# Patient Record
Sex: FEMALE | Race: BLACK OR AFRICAN AMERICAN | NOT HISPANIC OR LATINO | Employment: UNEMPLOYED | URBAN - METROPOLITAN AREA
[De-identification: names, ages, dates, MRNs, and addresses within clinical notes are randomized per-mention and may not be internally consistent; named-entity substitution may affect disease eponyms.]

---

## 2024-06-18 ENCOUNTER — APPOINTMENT (OUTPATIENT)
Dept: RADIOLOGY | Facility: HOSPITAL | Age: 64
End: 2024-06-18
Payer: COMMERCIAL

## 2024-06-18 ENCOUNTER — CLINICAL SUPPORT (OUTPATIENT)
Dept: EMERGENCY MEDICINE | Facility: HOSPITAL | Age: 64
End: 2024-06-18
Payer: COMMERCIAL

## 2024-06-18 ENCOUNTER — HOSPITAL ENCOUNTER (OUTPATIENT)
Facility: HOSPITAL | Age: 64
Setting detail: OBSERVATION
Discharge: HOME | End: 2024-06-19
Attending: EMERGENCY MEDICINE
Payer: COMMERCIAL

## 2024-06-18 DIAGNOSIS — R55 SYNCOPE, UNSPECIFIED SYNCOPE TYPE: Primary | ICD-10-CM

## 2024-06-18 DIAGNOSIS — I10 ESSENTIAL HYPERTENSION: ICD-10-CM

## 2024-06-18 LAB
ALBUMIN SERPL BCP-MCNC: 3.2 G/DL (ref 3.4–5)
ALBUMIN SERPL BCP-MCNC: 3.7 G/DL (ref 3.4–5)
ALP SERPL-CCNC: 62 U/L (ref 33–136)
ALP SERPL-CCNC: 67 U/L (ref 33–136)
ALT SERPL W P-5'-P-CCNC: 6 U/L (ref 7–45)
ALT SERPL W P-5'-P-CCNC: 7 U/L (ref 7–45)
ANION GAP SERPL CALC-SCNC: 19 MMOL/L (ref 10–20)
ANION GAP SERPL CALC-SCNC: 21 MMOL/L (ref 10–20)
AST SERPL W P-5'-P-CCNC: 24 U/L (ref 9–39)
AST SERPL W P-5'-P-CCNC: 40 U/L (ref 9–39)
ATRIAL RATE: 113 BPM
BASOPHILS # BLD AUTO: 0.06 X10*3/UL (ref 0–0.1)
BASOPHILS NFR BLD AUTO: 0.8 %
BILIRUB SERPL-MCNC: 1.4 MG/DL (ref 0–1.2)
BILIRUB SERPL-MCNC: 1.4 MG/DL (ref 0–1.2)
BUN SERPL-MCNC: 5 MG/DL (ref 6–23)
BUN SERPL-MCNC: 7 MG/DL (ref 6–23)
CALCIUM SERPL-MCNC: 9.1 MG/DL (ref 8.6–10.6)
CALCIUM SERPL-MCNC: 9.3 MG/DL (ref 8.6–10.6)
CARDIAC TROPONIN I PNL SERPL HS: 16 NG/L (ref 0–34)
CARDIAC TROPONIN I PNL SERPL HS: 23 NG/L (ref 0–34)
CHLORIDE SERPL-SCNC: 93 MMOL/L (ref 98–107)
CHLORIDE SERPL-SCNC: 96 MMOL/L (ref 98–107)
CO2 SERPL-SCNC: 21 MMOL/L (ref 21–32)
CO2 SERPL-SCNC: 24 MMOL/L (ref 21–32)
CREAT SERPL-MCNC: 0.39 MG/DL (ref 0.5–1.05)
CREAT SERPL-MCNC: 0.45 MG/DL (ref 0.5–1.05)
EGFRCR SERPLBLD CKD-EPI 2021: >90 ML/MIN/1.73M*2
EGFRCR SERPLBLD CKD-EPI 2021: >90 ML/MIN/1.73M*2
EOSINOPHIL # BLD AUTO: 0.04 X10*3/UL (ref 0–0.7)
EOSINOPHIL NFR BLD AUTO: 0.5 %
ERYTHROCYTE [DISTWIDTH] IN BLOOD BY AUTOMATED COUNT: 13.2 % (ref 11.5–14.5)
GLUCOSE BLD MANUAL STRIP-MCNC: 90 MG/DL (ref 74–99)
GLUCOSE SERPL-MCNC: 91 MG/DL (ref 74–99)
GLUCOSE SERPL-MCNC: 96 MG/DL (ref 74–99)
HCT VFR BLD AUTO: 37.6 % (ref 36–46)
HGB BLD-MCNC: 13.6 G/DL (ref 12–16)
IMM GRANULOCYTES # BLD AUTO: 0.04 X10*3/UL (ref 0–0.7)
IMM GRANULOCYTES NFR BLD AUTO: 0.5 % (ref 0–0.9)
LYMPHOCYTES # BLD AUTO: 1.68 X10*3/UL (ref 1.2–4.8)
LYMPHOCYTES NFR BLD AUTO: 21.3 %
MAGNESIUM SERPL-MCNC: 2.24 MG/DL (ref 1.6–2.4)
MCH RBC QN AUTO: 31.2 PG (ref 26–34)
MCHC RBC AUTO-ENTMCNC: 36.2 G/DL (ref 32–36)
MCV RBC AUTO: 86 FL (ref 80–100)
MONOCYTES # BLD AUTO: 0.71 X10*3/UL (ref 0.1–1)
MONOCYTES NFR BLD AUTO: 9 %
NEUTROPHILS # BLD AUTO: 5.36 X10*3/UL (ref 1.2–7.7)
NEUTROPHILS NFR BLD AUTO: 67.9 %
NRBC BLD-RTO: 0.3 /100 WBCS (ref 0–0)
P AXIS: 67 DEGREES
P OFFSET: 209 MS
P ONSET: 161 MS
PLATELET # BLD AUTO: 313 X10*3/UL (ref 150–450)
POTASSIUM SERPL-SCNC: 3.9 MMOL/L (ref 3.5–5.3)
POTASSIUM SERPL-SCNC: 5.4 MMOL/L (ref 3.5–5.3)
PR INTERVAL: 130 MS
PROT SERPL-MCNC: 7.3 G/DL (ref 6.4–8.2)
PROT SERPL-MCNC: 8.6 G/DL (ref 6.4–8.2)
Q ONSET: 226 MS
QRS COUNT: 18 BEATS
QRS DURATION: 112 MS
QT INTERVAL: 368 MS
QTC CALCULATION(BAZETT): 504 MS
QTC FREDERICIA: 454 MS
R AXIS: 46 DEGREES
RBC # BLD AUTO: 4.36 X10*6/UL (ref 4–5.2)
SODIUM SERPL-SCNC: 130 MMOL/L (ref 136–145)
SODIUM SERPL-SCNC: 135 MMOL/L (ref 136–145)
T AXIS: 30 DEGREES
T OFFSET: 410 MS
VENTRICULAR RATE: 113 BPM
WBC # BLD AUTO: 7.9 X10*3/UL (ref 4.4–11.3)

## 2024-06-18 PROCEDURE — 84484 ASSAY OF TROPONIN QUANT: CPT

## 2024-06-18 PROCEDURE — 84484 ASSAY OF TROPONIN QUANT: CPT | Mod: 91

## 2024-06-18 PROCEDURE — 96361 HYDRATE IV INFUSION ADD-ON: CPT | Mod: 59

## 2024-06-18 PROCEDURE — 80053 COMPREHEN METABOLIC PANEL: CPT | Mod: 91

## 2024-06-18 PROCEDURE — G0378 HOSPITAL OBSERVATION PER HR: HCPCS

## 2024-06-18 PROCEDURE — 93010 ELECTROCARDIOGRAM REPORT: CPT | Performed by: EMERGENCY MEDICINE

## 2024-06-18 PROCEDURE — 85025 COMPLETE CBC W/AUTO DIFF WBC: CPT

## 2024-06-18 PROCEDURE — 93005 ELECTROCARDIOGRAM TRACING: CPT

## 2024-06-18 PROCEDURE — 99285 EMERGENCY DEPT VISIT HI MDM: CPT | Mod: 25

## 2024-06-18 PROCEDURE — 71045 X-RAY EXAM CHEST 1 VIEW: CPT

## 2024-06-18 PROCEDURE — 36415 COLL VENOUS BLD VENIPUNCTURE: CPT

## 2024-06-18 PROCEDURE — 71045 X-RAY EXAM CHEST 1 VIEW: CPT | Performed by: RADIOLOGY

## 2024-06-18 PROCEDURE — 83735 ASSAY OF MAGNESIUM: CPT

## 2024-06-18 PROCEDURE — 2500000004 HC RX 250 GENERAL PHARMACY W/ HCPCS (ALT 636 FOR OP/ED)

## 2024-06-18 PROCEDURE — 70450 CT HEAD/BRAIN W/O DYE: CPT | Performed by: RADIOLOGY

## 2024-06-18 PROCEDURE — 96360 HYDRATION IV INFUSION INIT: CPT | Mod: 59

## 2024-06-18 PROCEDURE — 82947 ASSAY GLUCOSE BLOOD QUANT: CPT | Mod: 59

## 2024-06-18 PROCEDURE — 70450 CT HEAD/BRAIN W/O DYE: CPT

## 2024-06-18 PROCEDURE — 99285 EMERGENCY DEPT VISIT HI MDM: CPT | Performed by: EMERGENCY MEDICINE

## 2024-06-18 ASSESSMENT — PAIN SCALES - GENERAL: PAINLEVEL_OUTOF10: 0 - NO PAIN

## 2024-06-18 ASSESSMENT — COLUMBIA-SUICIDE SEVERITY RATING SCALE - C-SSRS
1. IN THE PAST MONTH, HAVE YOU WISHED YOU WERE DEAD OR WISHED YOU COULD GO TO SLEEP AND NOT WAKE UP?: NO
6. HAVE YOU EVER DONE ANYTHING, STARTED TO DO ANYTHING, OR PREPARED TO DO ANYTHING TO END YOUR LIFE?: NO
2. HAVE YOU ACTUALLY HAD ANY THOUGHTS OF KILLING YOURSELF?: NO

## 2024-06-18 ASSESSMENT — PAIN - FUNCTIONAL ASSESSMENT: PAIN_FUNCTIONAL_ASSESSMENT: 0-10

## 2024-06-18 NOTE — ED PROVIDER NOTES
EMERGENCY DEPARTMENT ENCOUNTER      Pt Name: Sade Sampson  MRN: [id number]  Birthdate 1960  Date of evaluation: 6/18/2024  Provider: Khoi Kennedy DO    CHIEF COMPLAINT       Chief Complaint   Patient presents with    Dizziness    Heat Exposure         HISTORY OF PRESENT ILLNESS    HPI    64-year-old female with past medical history significant for hypertension currently not on hypertensive medications and who has not followed up with a physician since 2017 presenting to the emergency department for evaluation after 2 brief back-to-back syncopal episodes.  Patient's son was at bedside and witnessed the event states patient was sitting in side in her chair watching TV when she passed out for a couple of seconds immediately woke up and after a minute or so had another brief episode that lasted a couple of seconds.  Patient states she felt a little bit nauseous prior to the syncopal episode but otherwise denies any symptoms such as chest pain, shortness of breath, lightheadedness, heart palpitations.  Patient does not have air conditioning in her house and reportedly improved after being placed in the air conditioned ambulance. States she has been well and has not been sick over the past couple weeks.  Denies lower extremity pain or swelling, URI or UTI symptoms.  Patient states she is never passed out like this in the past does not have any personal or family history of cardiac problems as far she is aware.     Nursing Notes were reviewed.    PAST MEDICAL HISTORY   No past medical history on file.      SURGICAL HISTORY     No past surgical history on file.      CURRENT MEDICATIONS       Previous Medications    No medications on file       ALLERGIES     Patient has no known allergies.    FAMILY HISTORY     No family history on file.       SOCIAL HISTORY       Social History     Socioeconomic History    Marital status: Single     Spouse name: Not on file    Number of children: Not on file    Years of  education: Not on file    Highest education level: Not on file   Occupational History    Not on file   Tobacco Use    Smoking status: Not on file    Smokeless tobacco: Not on file   Substance and Sexual Activity    Alcohol use: Not on file    Drug use: Not on file    Sexual activity: Not on file   Other Topics Concern    Not on file   Social History Narrative    Not on file     Social Determinants of Health     Financial Resource Strain: Not on file   Food Insecurity: Not on file   Transportation Needs: Not on file   Physical Activity: Not on file   Stress: Not on file   Social Connections: Not on file   Intimate Partner Violence: Not on file   Housing Stability: Not on file       SCREENINGS                        PHYSICAL EXAM    (up to 7 for level 4, 8 or more for level 5)     ED Triage Vitals   Temperature Heart Rate Respirations BP   06/18/24 1308 06/18/24 1308 06/18/24 1308 06/18/24 1308   36.6 °C (97.9 °F) (!) 112 16 179/78      Pulse Ox Temp src Heart Rate Source Patient Position   06/18/24 1308 -- -- --   99 %         BP Location FiO2 (%)     06/18/24 1557 --     Left arm        Physical Exam  Vitals and nursing note reviewed.   Constitutional:       General: She is not in acute distress.     Appearance: Normal appearance. She is not ill-appearing or toxic-appearing.   HENT:      Head: Normocephalic and atraumatic.      Right Ear: External ear normal.      Left Ear: External ear normal.      Mouth/Throat:      Comments: Tongue laceration  Eyes:      Conjunctiva/sclera: Conjunctivae normal.   Cardiovascular:      Rate and Rhythm: Regular rhythm. Tachycardia present.      Pulses: Normal pulses.      Heart sounds: Normal heart sounds.   Pulmonary:      Effort: Pulmonary effort is normal.      Breath sounds: Normal breath sounds.   Abdominal:      General: Abdomen is flat. There is no distension.      Palpations: Abdomen is soft. There is no mass.      Tenderness: There is no abdominal tenderness. There is no  guarding or rebound.      Hernia: No hernia is present.   Musculoskeletal:         General: No swelling, tenderness, deformity or signs of injury. Normal range of motion.      Cervical back: Normal range of motion and neck supple. No rigidity or tenderness.      Right lower leg: No edema.      Left lower leg: No edema.   Lymphadenopathy:      Cervical: No cervical adenopathy.   Skin:     General: Skin is warm and dry.   Neurological:      General: No focal deficit present.      Mental Status: She is alert and oriented to person, place, and time.      Cranial Nerves: No cranial nerve deficit.      Sensory: No sensory deficit.      Motor: No weakness.      Coordination: Coordination normal.   Psychiatric:         Mood and Affect: Mood normal.         Behavior: Behavior normal.          DIAGNOSTIC RESULTS     LABS:  Labs Reviewed   CBC WITH AUTO DIFFERENTIAL - Abnormal       Result Value    WBC 7.9      nRBC 0.3 (*)     RBC 4.36      Hemoglobin 13.6      Hematocrit 37.6      MCV 86      MCH 31.2      MCHC 36.2 (*)     RDW 13.2      Platelets 313      Neutrophils % 67.9      Immature Granulocytes %, Automated 0.5      Lymphocytes % 21.3      Monocytes % 9.0      Eosinophils % 0.5      Basophils % 0.8      Neutrophils Absolute 5.36      Immature Granulocytes Absolute, Automated 0.04      Lymphocytes Absolute 1.68      Monocytes Absolute 0.71      Eosinophils Absolute 0.04      Basophils Absolute 0.06     COMPREHENSIVE METABOLIC PANEL - Abnormal    Glucose 96      Sodium 130 (*)     Potassium 5.4 (*)     Chloride 93 (*)     Bicarbonate 21      Anion Gap 21 (*)     Urea Nitrogen 7      Creatinine 0.45 (*)     eGFR >90      Calcium 9.3      Albumin 3.7      Alkaline Phosphatase 67      Total Protein 8.6 (*)     AST 40 (*)     Bilirubin, Total 1.4 (*)     ALT 7     COMPREHENSIVE METABOLIC PANEL - Abnormal    Glucose 91      Sodium 135 (*)     Potassium 3.9      Chloride 96 (*)     Bicarbonate 24      Anion Gap 19      Urea  Nitrogen 5 (*)     Creatinine 0.39 (*)     eGFR >90      Calcium 9.1      Albumin 3.2 (*)     Alkaline Phosphatase 62      Total Protein 7.3      AST 24      Bilirubin, Total 1.4 (*)     ALT 6 (*)    MAGNESIUM - Normal    Magnesium 2.24     SERIAL TROPONIN-INITIAL - Normal    Troponin I, High Sensitivity 16      Narrative:     Less than 99th percentile of normal range cutoff-  Female and children under 18 years old <35 ng/L; Male <54 ng/L: Negative  Repeat testing should be performed if clinically indicated.     Female and children under 18 years old  ng/L; Male  ng/L:  Consistent with possible cardiac damage and possible increased clinical   risk. Serial measurements may help to assess extent of myocardial damage.     >120 ng/L: Consistent with cardiac damage, increased clinical risk and  myocardial infarction. Serial measurements may help assess extent of   myocardial damage.      NOTE: Children less than 1 year old may have higher baseline troponin   levels and results should be interpreted in conjunction with the overall   clinical context.    NOTE: Troponin I testing is performed using a different   testing methodology at [facility] than at other   [facility]. Direct result comparisons should only   be made within the same method.     SERIAL TROPONIN, 1 HOUR - Normal    Troponin I, High Sensitivity 23      Narrative:     Less than 99th percentile of normal range cutoff-  Female and children under 18 years old <35 ng/L; Male <54 ng/L: Negative  Repeat testing should be performed if clinically indicated.     Female and children under 18 years old  ng/L; Male  ng/L:  Consistent with possible cardiac damage and possible increased clinical   risk. Serial measurements may help to assess extent of myocardial damage.     >120 ng/L: Consistent with cardiac damage, increased clinical risk and  myocardial infarction. Serial measurements may help assess extent of   myocardial  damage.      NOTE: Children less than 1 year old may have higher baseline troponin   levels and results should be interpreted in conjunction with the overall   clinical context.    NOTE: Troponin I testing is performed using a different   testing methodology at [facility] than at other   [facility]. Direct result comparisons should only   be made within the same method.     POCT GLUCOSE - Normal    POCT Glucose 90     TROPONIN SERIES- (INITIAL, 1 HR)    Narrative:     The following orders were created for panel order Troponin I Series, High Sensitivity (0, 1 HR).  Procedure                               Abnormality         Status                     ---------                               -----------         ------                     Troponin I, High Sensiti...[[id number]]  Normal              Final result               Troponin, High Sensitivi...[[id number]]  Normal              Final result                 Please view results for these tests on the individual orders.   POCT GLUCOSE METER       All other labs were within normal range or not returned as of this dictation.    Imaging  CT head wo IV contrast   Final Result   No acute intracranial hemorrhage or mass effect.             MACRO:   None        Signed by: Bertrand Haas 6/18/2024 9:54 PM   Dictation workstation:   [id number]      XR chest 1 view   Final Result   1. Slight hyperinflation with some prominent interstitium suggesting   some COPD changes. No evidence of acute intrathoracic abnormality.        I personally reviewed the images/study and I agree with the findings   as stated by Dr. Tan Epperson. This study was interpreted at   [facility], [place], [place].        MACRO:   None        Signed by: Meng Grace 6/18/2024 6:52 PM   Dictation workstation:   [id number]           Procedures  Procedures     EMERGENCY DEPARTMENT COURSE/MDM:     ED Course as of 06/18/24 2325   Tue Jun 18, 2024 2009  "Supervising resident note: 65yo F no significant medical hx (no close physician follow up) presented to the ED after 2 episodes of \"unresponsiveness with shaking.\" Initial concern for passing out 2/2 heat in the house.  On my exam pt has tongue lac and had incontinence c/f seizure. Will add CT head and consider neuro consult given long outpatient follow up times and no medical plug in. Will also continue syncope workup.     Mary Anderson MD.   Emergency Medicine, PGY-3 [KR]      ED Course User Index  [KR] Mary Anderson MD         Diagnoses as of 06/18/24 2325   Syncope, unspecified syncope type        Medical Decision Making    64-year-old female with past medical history significant for hypertension currently not on hypertensive medications and who has not followed up with a physician since 2017 presenting to the emergency department for evaluation after 2 brief back-to-back syncopal episodes.  Patient is hypertensive with a blood pressure 179/78, tachycardic with a heart rate of 112.  Vitals otherwise WNL.  Afebrile, no acute distress, nontoxic appearing.  Cardiac workup, liter bolus of LR ordered.  Senior resident evaluated patient noted tongue laceration and patient reported urinary incontinence suggestive of possible seizure.  CT head without IV contrast ordered and neurology consulted.  No evidence of acute pathology on CT.  Neurology evaluated patient and advised ordering EEG.  Per EEG tech study cannot be performed until tomorrow morning so patient was admitted to the CDU for syncope workup including EEG in the morning with neurology on consult.    EKG: Sinus tachycardia with a ventricular rate of 113 bpm, parable 130 ms,  ms, QTc 504 ms.  Right bundle branch block with no prior EKG available for comparison.  No evidence of acute ischemic changes.    Patient signed out to night resident Dr. Zee pending transfer over to the CDU.    Patient and or family in agreement and understanding of " treatment plan.  All questions answered.      I reviewed the case with the attending ED physician. The attending ED physician agrees with the plan. Patient and/or patient´s representative was counseled regarding labs, imaging, likely diagnosis, and plan. All questions were answered.    ED Medications administered this visit:    Medications   lactated Ringer's bolus 1,000 mL (0 mL intravenous Stopped 6/18/24 1381)       New Prescriptions from this visit:    New Prescriptions    No medications on file       Follow-up:  No follow-up provider specified.      Final Impression:   1. Syncope, unspecified syncope type          (Please note that portions of this note were completed with a voice recognition program.  Efforts were made to edit the dictations but occasionally words are mis-transcribed.)     Khoi Kennedy,   Resident  06/18/24 4800

## 2024-06-18 NOTE — ED TRIAGE NOTES
Patient family states that the patient stopped responding and had head shaking that lasted about 45 seconds. Patient brother was calling her name and she was not responding and then when she awoke she was confused. Patient brother states that EMS believes that the house was hot and that when she got into the ambulance with the air conditioning she improved. Last thing patient remembers was talking to her brother. Patient has no complaints just saying she feels dizzy and unsteady. Has no AC in house

## 2024-06-19 ENCOUNTER — APPOINTMENT (OUTPATIENT)
Dept: CARDIOLOGY | Facility: HOSPITAL | Age: 64
End: 2024-06-19
Payer: COMMERCIAL

## 2024-06-19 ENCOUNTER — APPOINTMENT (OUTPATIENT)
Dept: NEUROLOGY | Facility: HOSPITAL | Age: 64
End: 2024-06-19
Payer: COMMERCIAL

## 2024-06-19 ENCOUNTER — PHARMACY VISIT (OUTPATIENT)
Dept: PHARMACY | Facility: CLINIC | Age: 64
End: 2024-06-19
Payer: COMMERCIAL

## 2024-06-19 ENCOUNTER — APPOINTMENT (OUTPATIENT)
Dept: RADIOLOGY | Facility: HOSPITAL | Age: 64
End: 2024-06-19
Payer: COMMERCIAL

## 2024-06-19 VITALS
HEART RATE: 92 BPM | BODY MASS INDEX: 29.66 KG/M2 | RESPIRATION RATE: 18 BRPM | WEIGHT: 178 LBS | SYSTOLIC BLOOD PRESSURE: 169 MMHG | HEIGHT: 65 IN | OXYGEN SATURATION: 98 % | DIASTOLIC BLOOD PRESSURE: 70 MMHG | TEMPERATURE: 97 F

## 2024-06-19 LAB
AORTIC VALVE PEAK VELOCITY: 1.51 M/S
AV PEAK GRADIENT: 9.1 MMHG
AVA (PEAK VEL): 2.63 CM2
EJECTION FRACTION APICAL 4 CHAMBER: 75.3
LEFT ATRIUM VOLUME AREA LENGTH INDEX BSA: 12.9 ML/M2
LEFT VENTRICLE INTERNAL DIMENSION DIASTOLE: 3.3 CM (ref 3.5–6)
LEFT VENTRICULAR OUTFLOW TRACT DIAMETER: 1.9 CM
LV EJECTION FRACTION BIPLANE: 77 %
MITRAL VALVE E/A RATIO: 0.61
MITRAL VALVE E/E' RATIO: 14.33
RIGHT VENTRICLE FREE WALL PEAK S': 8.81 CM/S
TRICUSPID ANNULAR PLANE SYSTOLIC EXCURSION: 2.1 CM

## 2024-06-19 PROCEDURE — 95816 EEG AWAKE AND DROWSY: CPT | Performed by: PSYCHIATRY & NEUROLOGY

## 2024-06-19 PROCEDURE — 93880 EXTRACRANIAL BILAT STUDY: CPT | Performed by: RADIOLOGY

## 2024-06-19 PROCEDURE — G0378 HOSPITAL OBSERVATION PER HR: HCPCS

## 2024-06-19 PROCEDURE — 93880 EXTRACRANIAL BILAT STUDY: CPT

## 2024-06-19 PROCEDURE — RXMED WILLOW AMBULATORY MEDICATION CHARGE

## 2024-06-19 PROCEDURE — 93306 TTE W/DOPPLER COMPLETE: CPT | Performed by: INTERNAL MEDICINE

## 2024-06-19 PROCEDURE — 2500000004 HC RX 250 GENERAL PHARMACY W/ HCPCS (ALT 636 FOR OP/ED)

## 2024-06-19 PROCEDURE — 93306 TTE W/DOPPLER COMPLETE: CPT

## 2024-06-19 PROCEDURE — 95816 EEG AWAKE AND DROWSY: CPT

## 2024-06-19 RX ORDER — LISINOPRIL 10 MG/1
10 TABLET ORAL DAILY
Qty: 30 TABLET | Refills: 0 | Status: SHIPPED | OUTPATIENT
Start: 2024-06-19 | End: 2024-07-19

## 2024-06-19 RX ORDER — HYDRALAZINE HYDROCHLORIDE 20 MG/ML
10 INJECTION INTRAMUSCULAR; INTRAVENOUS ONCE
Status: DISCONTINUED | OUTPATIENT
Start: 2024-06-19 | End: 2024-06-19

## 2024-06-19 RX ORDER — LISINOPRIL 20 MG/1
20 TABLET ORAL DAILY
Status: DISCONTINUED | OUTPATIENT
Start: 2024-06-19 | End: 2024-06-19 | Stop reason: HOSPADM

## 2024-06-19 NOTE — H&P
History and Physical  [facility] EMERGENCY MEDICINE  Patient: Sade Sampson  MRN: [id number]  : 1960  Date of Evaluation: 2024  ED Provider: COY Kruger      Limitations to history: None  Independent Historian: Patient  External Records Reviewed: Yes      Patient History:  Sade Sampson is a 64 y.o. female with past medical history significant for HTN, and presents to the clinical decision unit for syncope.  Patient states she is unsure what occurred, states family told her she passed out.  Per electronic records boyfriend witnessed 2 episodes of patient passing out.  Patient denies history of seizures, diabetes, strokes or MI.  Patient denies fever, chills, chest pain, shortness of breath, headache, nausea, abdominal pain, hematuria or bloody stool.    The acute medical evaluation included EKG, labs and imaging.  Her initial EKG showed ST, no acute ischemic changes  CBC showed no leukocytosis, acute anemia or thrombocytopenia.  CMP showed sodium low at 135, chloride low at 96, BUN low at 5, creatinine low at 0.39, ALT low at 6, otherwise no significant electrolyte derangement.  Serial high-sensitivity troponins 23, 16 magnesium within normal limits at 2.24.  CT head showed no evidence of ICH, mass, midline shift or infarct.  CXR showed Slight hyperinflation with some prominent interstitium suggesting some COPD changes. No evidence of acute intrathoracic abnormality, osseous changes, pneumonia, pleural effusion or focal consolidation.     While in the ED, she received 1L of LR.  Neurology was consulted and recommended:  - Please obtain a STAT routine EEG   - If EEG is normal, would discharge with seizure precautions (see below)   - If EEG is suggestive of epileptic event, would discharge on keppra 750 mg q12hr (can load with 1500 mg IV) and seizure precautions (see below)   - Please request follow up with Neurology (resident clinic) in 1-2 months  .  After discussion with  the ED provider, a decision was made to admit the patient to the Clinical Decision Unit for syncope.  Plan is to complete a syncopal workup and EEG scheduled for tomorrow.          In the emergency department, the acute evaluation included:  Orders Placed This Encounter   Procedures    XR chest 1 view    CT head wo IV contrast    Troponin I Series, High Sensitivity (0, 1 HR)    CBC and Auto Differential    Comprehensive Metabolic Panel    Magnesium    Troponin I, High Sensitivity, Initial    Troponin, High Sensitivity, 1 Hour    Comprehensive metabolic panel    Vital Signs    Cardiac Monitoring - ED/ICU/PACU Only    Orthostatic vitals    POCT GLUCOSE    ECG 12 lead    ECG 12 lead    ECG 12 lead    EEG    Insert and maintain peripheral IV    Send to CDU    Initiate observation status       I reviewed the below labs and imaging as ordered by the ED provider:  CT head wo IV contrast   Final Result   No acute intracranial hemorrhage or mass effect.             MACRO:   None        Signed by: Bertrand Haas 6/18/2024 9:54 PM   Dictation workstation:   [id number]      XR chest 1 view   Final Result   1. Slight hyperinflation with some prominent interstitium suggesting   some COPD changes. No evidence of acute intrathoracic abnormality.        I personally reviewed the images/study and I agree with the findings   as stated by Dr. Tan Epperson. This study was interpreted at   [facility], [place], [place].        MACRO:   None        Signed by: Meng Grace 6/18/2024 6:52 PM   Dictation workstation:   [id number]          Labs Reviewed   CBC WITH AUTO DIFFERENTIAL - Abnormal       Result Value    WBC 7.9      nRBC 0.3 (*)     RBC 4.36      Hemoglobin 13.6      Hematocrit 37.6      MCV 86      MCH 31.2      MCHC 36.2 (*)     RDW 13.2      Platelets 313      Neutrophils % 67.9      Immature Granulocytes %, Automated 0.5      Lymphocytes % 21.3      Monocytes % 9.0      Eosinophils % 0.5       Basophils % 0.8      Neutrophils Absolute 5.36      Immature Granulocytes Absolute, Automated 0.04      Lymphocytes Absolute 1.68      Monocytes Absolute 0.71      Eosinophils Absolute 0.04      Basophils Absolute 0.06     COMPREHENSIVE METABOLIC PANEL - Abnormal    Glucose 96      Sodium 130 (*)     Potassium 5.4 (*)     Chloride 93 (*)     Bicarbonate 21      Anion Gap 21 (*)     Urea Nitrogen 7      Creatinine 0.45 (*)     eGFR >90      Calcium 9.3      Albumin 3.7      Alkaline Phosphatase 67      Total Protein 8.6 (*)     AST 40 (*)     Bilirubin, Total 1.4 (*)     ALT 7     COMPREHENSIVE METABOLIC PANEL - Abnormal    Glucose 91      Sodium 135 (*)     Potassium 3.9      Chloride 96 (*)     Bicarbonate 24      Anion Gap 19      Urea Nitrogen 5 (*)     Creatinine 0.39 (*)     eGFR >90      Calcium 9.1      Albumin 3.2 (*)     Alkaline Phosphatase 62      Total Protein 7.3      AST 24      Bilirubin, Total 1.4 (*)     ALT 6 (*)    MAGNESIUM - Normal    Magnesium 2.24     SERIAL TROPONIN-INITIAL - Normal    Troponin I, High Sensitivity 16      Narrative:     Less than 99th percentile of normal range cutoff-  Female and children under 18 years old <35 ng/L; Male <54 ng/L: Negative  Repeat testing should be performed if clinically indicated.     Female and children under 18 years old  ng/L; Male  ng/L:  Consistent with possible cardiac damage and possible increased clinical   risk. Serial measurements may help to assess extent of myocardial damage.     >120 ng/L: Consistent with cardiac damage, increased clinical risk and  myocardial infarction. Serial measurements may help assess extent of   myocardial damage.      NOTE: Children less than 1 year old may have higher baseline troponin   levels and results should be interpreted in conjunction with the overall   clinical context.    NOTE: Troponin I testing is performed using a different   testing methodology at [facility] than at other    [facility]. Direct result comparisons should only   be made within the same method.     SERIAL TROPONIN, 1 HOUR - Normal    Troponin I, High Sensitivity 23      Narrative:     Less than 99th percentile of normal range cutoff-  Female and children under 18 years old <35 ng/L; Male <54 ng/L: Negative  Repeat testing should be performed if clinically indicated.     Female and children under 18 years old  ng/L; Male  ng/L:  Consistent with possible cardiac damage and possible increased clinical   risk. Serial measurements may help to assess extent of myocardial damage.     >120 ng/L: Consistent with cardiac damage, increased clinical risk and  myocardial infarction. Serial measurements may help assess extent of   myocardial damage.      NOTE: Children less than 1 year old may have higher baseline troponin   levels and results should be interpreted in conjunction with the overall   clinical context.    NOTE: Troponin I testing is performed using a different   testing methodology at [facility] than at other   [facility]. Direct result comparisons should only   be made within the same method.     POCT GLUCOSE - Normal    POCT Glucose 90     TROPONIN SERIES- (INITIAL, 1 HR)    Narrative:     The following orders were created for panel order Troponin I Series, High Sensitivity (0, 1 HR).  Procedure                               Abnormality         Status                     ---------                               -----------         ------                     Troponin I, High Sensiti...[[id number]]  Normal              Final result               Troponin, High Sensitivi...[[id number]]  Normal              Final result                 Please view results for these tests on the individual orders.   POCT GLUCOSE METER       Upon admission to the Clinical Decision Unit,  Sade Sampson is alert and oriented and appears in no acute distress.  Vital signs were reviewed.    Past History   No  "past medical history on file.  No past surgical history on file.  Social History     Socioeconomic History    Marital status: Single     Spouse name: Not on file    Number of children: Not on file    Years of education: Not on file    Highest education level: Not on file   Occupational History    Not on file   Tobacco Use    Smoking status: Not on file    Smokeless tobacco: Not on file   Substance and Sexual Activity    Alcohol use: Not on file    Drug use: Not on file    Sexual activity: Not on file   Other Topics Concern    Not on file   Social History Narrative    Not on file     Social Determinants of Health     Financial Resource Strain: Not on file   Food Insecurity: Not on file   Transportation Needs: Not on file   Physical Activity: Not on file   Stress: Not on file   Social Connections: Not on file   Intimate Partner Violence: Not on file   Housing Stability: Not on file         Medications/Allergies     Previous Medications    No medications on file     No Known Allergies          Review of systems:  All systems reviewed and otherwise negative, except as stated above in HPI.        Physical Exam     Visit Vitals  /81 (BP Location: Left arm, Patient Position: Lying)   Pulse (!) 102   Temp 36.1 °C (97 °F) (Temporal)   Resp 18   Ht 1.651 m (5' 5\")   Wt 80.7 kg (178 lb)   SpO2 100%   BMI 29.62 kg/m²   BSA 1.92 m²       GENERAL:  The patient appears nontoxic, nourished and normally developed. Vital signs as documented.     HEENT:  Head normocephalic, atraumatic, EOMs intact, Mucous membranes moist. Nares patent without copious rhinorrhea.  Oropharynx moist and clear.  No drooling. Uvula midline.    Neck: Supple.  No meningismus.  No swelling.  Trachea midline. No lymphadenopathy.    Pulmonary:  Lungs are clear to auscultation, without any respiratory distress.  No wheezing, crackles or rales.  No hypoxia or dyspnea.  Able to speak full sentences, no accessory muscle use.    Cardiac:  Tachycardiac.  No " murmurs, rubs or gallops.  No JVD.    GI:  Soft, non-distended, non-tender, BS positive x 4 quadrants, No rebound or guarding, no peritoneal signs, no CVA tenderness, no masses or organomegaly.    Musculoskeletal: Symmetrical muscle bulk. No peripheral edema.  Pulses intact distal.  Able to walk.    Integumentary: Warm, dry and intact.  No pallor or jaundice.  No lesions, rashes or open sores.    NEURO:  NIH 0. No obvious neurological deficits, normal sensation and strength bilaterally.  Speech clear and fluent.  Able to follow commands, CN 2-12 intact.    Psych: Appropriate mood and affect.    Consultants:  1.)  Neurology following            Impression and Plan  In summary, Sade Sampson is admitted to the [facility] Clinical Decision Unit for syncope. Dr. Jose David Alvarez is the CDU admission attending.    This patient has been risk-stratified based on available history, physical exam, and related study findings. Admission to the observation status for further diagnosis/treatment/monitoring of syncope is warranted clinically. This extended period of observation is specifically required to determine the need for hospitalization.     The goals of this admission based on the patient’s clinical problem list are:   1.)  Syncope  -Continuous telemetry  -Echo scheduled  -Bilateral carotid ultrasound scheduled  -EEG in a.m.  -Cardiac diet    We will observe the patient for the following endpoints:   1.) Stable vitals  2.)  Symptomatic improvement.  3.)  Clear EEG  4.)  Clear Echo and B/L Carotid US  5.) Cleared by neurology    When met, appropriate disposition will be arranged.    Karla Morales, APRN-CNP  [facility]  Emergency department  Extension [phone]

## 2024-06-19 NOTE — CONSULTS
"Epilepsy Consult     Reason for consult: Two episodes of shaking c/f seizures     History Of Present Illness  Sade Sampson is a 64 y.o. female with prior history of HTN, not on any medications, presenting after two event of passing out witnessed by her boyfriend.     History was obtained by chart review and from boyfriend of 15 years (Hari [phone]), who states that she was sitting in their living room, when she suddenly passed out for ~45 seconds, eyes closed, both hands were shaking, and she returned to her baseline after. Shortly after, she had another event, lasted for 5 seconds, same description, eyes were closed with b/l hand shaking.     She denied any prodromal symptoms apart from feeling nauseas before the two events. No prior history of similar events. No history of fever, headaches, flu like symptoms, or recent sickness.     Denied any history of incontinence during sleep, waking up with tongue lacerations/soreness. No family history of seizures. No personal history of CNS infections, head trauma, febrile seizures, or strokes.     Past Medical History  HTN, not on treatment     Surgical History  No surgery     Social History  Lives with her  boyfriend. Fully independent with ADLs.   Denied smoking, alcohol use, or recreational drug use.     Allergies  Patient has no known allergies.    Review of Systems  All systems were reviewed, and negative apart from stated above.     Neurological Exam  Physical Exam  Last Recorded Vitals  Blood pressure 160/81, pulse (!) 102, temperature 36.1 °C (97 °F), temperature source Temporal, resp. rate 18, height 1.651 m (5' 5\"), weight 80.7 kg (178 lb), SpO2 100%.    General Appearance:  No distress, alert, interactive and cooperative.     Cardiovascular: Regular, rate and rhythm, no murmurs, normal S1 and S2.     Mental State: Orientation was normal to time, place and person.   She was able to answer questions correctly, like \"who's the president?\". She was able " to follow complex commands.     Language: Fluent, answers questions appropriately, intact naming of both high and low frequency objects.      Cranial Nerves:   Visual fields full to confrontation.   Pupils round, 4 mm in diameter, equally reactive to light.   EOMs normal alignment, full range with normal saccades.  Bilateral end-gaze nystagmus.   Facial sensation intact bilaterally.   Normal and symmetric facial strength. Nasolabial folds symmetric.   Palate elevates symmetrically.   Tongue midline, with normal bulk, some redness appreciated on the left lateral side, but no clear cut or bleeding.     Motor:   Muscle bulk and tone were normal in both upper and lower extremities.   No pronator drift     Strength     R      L   5      5     Shoulder abduction    5      5     Elbow flexion   5      5     Elbow extension    5      5     Hand      5     5      Hip flexion   5     5      knee extension   5     5      knee flexion   5     5      APF   5     5      ADF     Reflexes:   R     L   2     2   biceps   2     2   BR  1    1    patellar   0    0    ankle     Sensory: In both upper and lower extremities, sensation was intact to light touch  No extinction on simultaneous stimuli.     Coordination: In both upper extremities, finger-nose-finger was intact without dysmetria or overshoot.      Relevant Results    Results from last 72 hours   Lab Units 06/18/24  1848   WBC AUTO x10*3/uL 7.9   HEMOGLOBIN g/dL 13.6   HEMATOCRIT % 37.6   PLATELETS AUTO x10*3/uL 313        Results from last 72 hours   Lab Units 06/18/24  2111 06/18/24  1848   SODIUM mmol/L 135* 130*   POTASSIUM mmol/L 3.9 5.4*   CHLORIDE mmol/L 96* 93*   CO2 mmol/L 24 21   BUN mg/dL 5* 7   CREATININE mg/dL 0.39* 0.45*   MAGNESIUM mg/dL  --  2.24        I have personally reviewed the following imaging results CT head wo IV contrast    CT HEAD WO IV CONTRAST;  6/18/2024 9:24 pm    Moderate diffuse parenchymal volume loss. There are nonspecific  periventricular and subcortical white matter hypodensities which may represent sequela of small-vessel ischemic changes. The grey-white differentiation is intact. There is no mass effect or midline shift.  There is no intracranial hemorrhage. There are calcifications of the intracranial internal carotid arteries.   Calvarium: The calvarium is unremarkable.   Paranasal sinuses and mastoids: There is mucosal thickening of the right maxillary sinus. Otherwise, paranasal sinuses and mastoid air cells are clear.          Assessment/Plan   Active Problems:  There are no active Hospital Problems.    Sade Sampson is a 64 y.o. female with prior history of HTN, not on any medications, presenting after two events witnessed by boyfriend; described as eyes closed, bilateral hands shaking, for few seconds, with return to her baseline in between. No incontinence, confusion, or weakness post-event. Prodromal symptoms were only nausea, and an episode of vomiting after the two events. Both event occur while she was sitting in her living room, with reportedly no AC.     Her neurological examination today is essentially normal, apart from non-specific bilateral end-gaze nystagmus.     CTH personally reviewed, notable for moderate diffuse cortical atrophy and white matter hypodensities most consistent with small vessel disease.     Impression: Likely a vasovagal event, given very brief duration, eyes closed, and return to baseline shortly after. Likely triggered by humidity and warm weather. However, given possible left tongue injury (redness), although  there is no obvious laceration, STAT routine EEG would be helpful to rule out focal slowing or epileptic discharges to suggest cortical focus.     Recommendations:   - Please obtain a STAT routine EEG   - If EEG is normal, would discharge with seizure precautions (see below)   - If EEG is suggestive of epileptic event, would discharge on keppra 750 mg q12hr (can load with 1500 mg IV)  and seizure precautions (see below)   - Please request follow up with Neurology (resident clinic) in 1-2 months    Night team covering epilepsy will reach out to ED/primary team after discussing EEG findings with epilepsy fellow.     Please page with any further questions or concerns.   Epilepsy team pager: [phone]     ------Seizure Precautions---------    - No driving until seizures are under controlled for the period determined by their treating physician.  - Not working with heavy machineries and in close proximity of machines with moving parts.   - Not swimming or bathing unsupervised  (ok to take shower).   - Not working at high places or climbing ladders, avoiding any activity that could put the patient or others at significant risk in the event of a seizure.  - Seizures First Aid and when to call 911 or notify their physician.    ----------------------------------    Mark Puente MD  Neurology Resident PGY3   ---------------------------------------------    Post Staffing updates     Agree that etiology likely convulsive syncope.  Routine EEG normal. Okay for discharge home with seizure precautions as above and outpatient follow up with neurology in resident clinic in 1-2 months.     Vicki Subramanian MD  Department of Neurology, PGY-2  Epilepsy pager [phone]

## 2024-06-19 NOTE — DISCHARGE SUMMARY
[facility] CLINICAL DECISION UNIT  DISCHARGE SUMMARY    Patient: Sade Sampson  MRN: [id number]  : 1960  Date of Evaluation: 2024  ED Provider: COY Barker    Limitations to history: None  Independent Historian: Yes  External Records Reviewed: Yes      Subjective:  Sade Sampson is a 64 year-old female has undergone comprehensive diagnostic evaluation and therapeutic management in accordance with the CDU guidelines for syncope. Based on Ms. Sampson clinical response and diagnostic information during this period of observation, it has been determined that the patient will be discharged.    The acute evaluation included:  Orders Placed This Encounter   Procedures    XR chest 1 view    CT head wo IV contrast    Troponin I Series, High Sensitivity (0, 1 HR)    CBC and Auto Differential    Comprehensive Metabolic Panel    Magnesium    Troponin I, High Sensitivity, Initial    Troponin, High Sensitivity, 1 Hour    Comprehensive metabolic panel    Referral to Primary Care    Referral to Cardiology    Referral to Neurology    Adult diet Regular, Cardiac; 70 gm fat; 2 - 3 grams Sodium    Vital Signs    Cardiac Monitoring - ED/ICU/PACU Only    Orthostatic vitals    Activity (specify) No Restrictions    Vital Signs    Telemetry monitoring    Orthostatic blood pressure    Full code    POCT GLUCOSE    ECG 12 lead    ECG 12 lead    ECG 12 lead    Electrocardiogram, 12-lead PRN ACS symptoms    Transthoracic Echo (TTE) Complete    EEG    Insert and maintain peripheral IV    Send to CDU    Initiate observation status       Results for orders placed or performed during the hospital encounter of 24   CBC and Auto Differential   Result Value Ref Range    WBC 7.9 4.4 - 11.3 x10*3/uL    nRBC 0.3 (H) 0.0 - 0.0 /100 WBCs    RBC 4.36 4.00 - 5.20 x10*6/uL    Hemoglobin 13.6 12.0 - 16.0 g/dL    Hematocrit 37.6 36.0 - 46.0 %    MCV 86 80 - 100 fL    MCH 31.2 26.0 - 34.0 pg    MCHC 36.2 (H) 32.0 - 36.0 g/dL    RDW  13.2 11.5 - 14.5 %    Platelets 313 150 - 450 x10*3/uL    Neutrophils % 67.9 40.0 - 80.0 %    Immature Granulocytes %, Automated 0.5 0.0 - 0.9 %    Lymphocytes % 21.3 13.0 - 44.0 %    Monocytes % 9.0 2.0 - 10.0 %    Eosinophils % 0.5 0.0 - 6.0 %    Basophils % 0.8 0.0 - 2.0 %    Neutrophils Absolute 5.36 1.20 - 7.70 x10*3/uL    Immature Granulocytes Absolute, Automated 0.04 0.00 - 0.70 x10*3/uL    Lymphocytes Absolute 1.68 1.20 - 4.80 x10*3/uL    Monocytes Absolute 0.71 0.10 - 1.00 x10*3/uL    Eosinophils Absolute 0.04 0.00 - 0.70 x10*3/uL    Basophils Absolute 0.06 0.00 - 0.10 x10*3/uL   Comprehensive Metabolic Panel   Result Value Ref Range    Glucose 96 74 - 99 mg/dL    Sodium 130 (L) 136 - 145 mmol/L    Potassium 5.4 (H) 3.5 - 5.3 mmol/L    Chloride 93 (L) 98 - 107 mmol/L    Bicarbonate 21 21 - 32 mmol/L    Anion Gap 21 (H) 10 - 20 mmol/L    Urea Nitrogen 7 6 - 23 mg/dL    Creatinine 0.45 (L) 0.50 - 1.05 mg/dL    eGFR >90 >60 mL/min/1.73m*2    Calcium 9.3 8.6 - 10.6 mg/dL    Albumin 3.7 3.4 - 5.0 g/dL    Alkaline Phosphatase 67 33 - 136 U/L    Total Protein 8.6 (H) 6.4 - 8.2 g/dL    AST 40 (H) 9 - 39 U/L    Bilirubin, Total 1.4 (H) 0.0 - 1.2 mg/dL    ALT 7 7 - 45 U/L   Magnesium   Result Value Ref Range    Magnesium 2.24 1.60 - 2.40 mg/dL   Troponin I, High Sensitivity, Initial   Result Value Ref Range    Troponin I, High Sensitivity 16 0 - 34 ng/L   Troponin, High Sensitivity, 1 Hour   Result Value Ref Range    Troponin I, High Sensitivity 23 0 - 34 ng/L   Comprehensive metabolic panel   Result Value Ref Range    Glucose 91 74 - 99 mg/dL    Sodium 135 (L) 136 - 145 mmol/L    Potassium 3.9 3.5 - 5.3 mmol/L    Chloride 96 (L) 98 - 107 mmol/L    Bicarbonate 24 21 - 32 mmol/L    Anion Gap 19 10 - 20 mmol/L    Urea Nitrogen 5 (L) 6 - 23 mg/dL    Creatinine 0.39 (L) 0.50 - 1.05 mg/dL    eGFR >90 >60 mL/min/1.73m*2    Calcium 9.1 8.6 - 10.6 mg/dL    Albumin 3.2 (L) 3.4 - 5.0 g/dL    Alkaline Phosphatase 62 33 - 136  U/L    Total Protein 7.3 6.4 - 8.2 g/dL    AST 24 9 - 39 U/L    Bilirubin, Total 1.4 (H) 0.0 - 1.2 mg/dL    ALT 6 (L) 7 - 45 U/L   POCT GLUCOSE   Result Value Ref Range    POCT Glucose 90 74 - 99 mg/dL   ECG 12 lead   Result Value Ref Range    Ventricular Rate 113 BPM    Atrial Rate 113 BPM    CA Interval 130 ms    QRS Duration 112 ms    QT Interval 368 ms    QTC Calculation(Bazett) 504 ms    P Axis 67 degrees    R Axis 46 degrees    T Axis 30 degrees    QRS Count 18 beats    Q Onset 226 ms    P Onset 161 ms    P Offset 209 ms    T Offset 410 ms    QTC Fredericia 454 ms   Transthoracic Echo (TTE) Complete   Result Value Ref Range    AV pk jonny 1.51 m/s    LVOT diam 1.90 cm    LV Biplane EF 77 %    MV avg E/e' ratio 14.33     MV E/A ratio 0.61     LA vol index A/L 12.9 ml/m2    Tricuspid annular plane systolic excursion 2.1 cm    RV free wall pk S' 8.81 cm/s    LVIDd 3.30 cm    Aortic Valve Area by Continuity of Peak Velocity 2.63 cm2    AV pk grad 9.1 mmHg    LV A4C EF 75.3        Past History   No past medical history on file.  No past surgical history on file.  Social History     Socioeconomic History    Marital status: Single     Spouse name: Not on file    Number of children: Not on file    Years of education: Not on file    Highest education level: Not on file   Occupational History    Not on file   Tobacco Use    Smoking status: Not on file    Smokeless tobacco: Not on file   Substance and Sexual Activity    Alcohol use: Not on file    Drug use: Not on file    Sexual activity: Not on file   Other Topics Concern    Not on file   Social History Narrative    Not on file     Social Determinants of Health     Financial Resource Strain: Not on file   Food Insecurity: Not on file   Transportation Needs: Not on file   Physical Activity: Not on file   Stress: Not on file   Social Connections: Not on file   Intimate Partner Violence: Not on file   Housing Stability: Not on file         Medications/Allergies     Previous  Medications    No medications on file     No Known Allergies      Review of Systems  All systems reviewed and otherwise negative, except as stated above in HPI.    Diagnostics reviewed by JARVIS Barker-CNP     Labs:  Results for orders placed or performed during the hospital encounter of 06/18/24   CBC and Auto Differential   Result Value Ref Range    WBC 7.9 4.4 - 11.3 x10*3/uL    nRBC 0.3 (H) 0.0 - 0.0 /100 WBCs    RBC 4.36 4.00 - 5.20 x10*6/uL    Hemoglobin 13.6 12.0 - 16.0 g/dL    Hematocrit 37.6 36.0 - 46.0 %    MCV 86 80 - 100 fL    MCH 31.2 26.0 - 34.0 pg    MCHC 36.2 (H) 32.0 - 36.0 g/dL    RDW 13.2 11.5 - 14.5 %    Platelets 313 150 - 450 x10*3/uL    Neutrophils % 67.9 40.0 - 80.0 %    Immature Granulocytes %, Automated 0.5 0.0 - 0.9 %    Lymphocytes % 21.3 13.0 - 44.0 %    Monocytes % 9.0 2.0 - 10.0 %    Eosinophils % 0.5 0.0 - 6.0 %    Basophils % 0.8 0.0 - 2.0 %    Neutrophils Absolute 5.36 1.20 - 7.70 x10*3/uL    Immature Granulocytes Absolute, Automated 0.04 0.00 - 0.70 x10*3/uL    Lymphocytes Absolute 1.68 1.20 - 4.80 x10*3/uL    Monocytes Absolute 0.71 0.10 - 1.00 x10*3/uL    Eosinophils Absolute 0.04 0.00 - 0.70 x10*3/uL    Basophils Absolute 0.06 0.00 - 0.10 x10*3/uL   Comprehensive Metabolic Panel   Result Value Ref Range    Glucose 96 74 - 99 mg/dL    Sodium 130 (L) 136 - 145 mmol/L    Potassium 5.4 (H) 3.5 - 5.3 mmol/L    Chloride 93 (L) 98 - 107 mmol/L    Bicarbonate 21 21 - 32 mmol/L    Anion Gap 21 (H) 10 - 20 mmol/L    Urea Nitrogen 7 6 - 23 mg/dL    Creatinine 0.45 (L) 0.50 - 1.05 mg/dL    eGFR >90 >60 mL/min/1.73m*2    Calcium 9.3 8.6 - 10.6 mg/dL    Albumin 3.7 3.4 - 5.0 g/dL    Alkaline Phosphatase 67 33 - 136 U/L    Total Protein 8.6 (H) 6.4 - 8.2 g/dL    AST 40 (H) 9 - 39 U/L    Bilirubin, Total 1.4 (H) 0.0 - 1.2 mg/dL    ALT 7 7 - 45 U/L   Magnesium   Result Value Ref Range    Magnesium 2.24 1.60 - 2.40 mg/dL   Troponin I, High Sensitivity, Initial   Result Value Ref Range     Troponin I, High Sensitivity 16 0 - 34 ng/L   Troponin, High Sensitivity, 1 Hour   Result Value Ref Range    Troponin I, High Sensitivity 23 0 - 34 ng/L   Comprehensive metabolic panel   Result Value Ref Range    Glucose 91 74 - 99 mg/dL    Sodium 135 (L) 136 - 145 mmol/L    Potassium 3.9 3.5 - 5.3 mmol/L    Chloride 96 (L) 98 - 107 mmol/L    Bicarbonate 24 21 - 32 mmol/L    Anion Gap 19 10 - 20 mmol/L    Urea Nitrogen 5 (L) 6 - 23 mg/dL    Creatinine 0.39 (L) 0.50 - 1.05 mg/dL    eGFR >90 >60 mL/min/1.73m*2    Calcium 9.1 8.6 - 10.6 mg/dL    Albumin 3.2 (L) 3.4 - 5.0 g/dL    Alkaline Phosphatase 62 33 - 136 U/L    Total Protein 7.3 6.4 - 8.2 g/dL    AST 24 9 - 39 U/L    Bilirubin, Total 1.4 (H) 0.0 - 1.2 mg/dL    ALT 6 (L) 7 - 45 U/L   POCT GLUCOSE   Result Value Ref Range    POCT Glucose 90 74 - 99 mg/dL   ECG 12 lead   Result Value Ref Range    Ventricular Rate 113 BPM    Atrial Rate 113 BPM    KY Interval 130 ms    QRS Duration 112 ms    QT Interval 368 ms    QTC Calculation(Bazett) 504 ms    P Axis 67 degrees    R Axis 46 degrees    T Axis 30 degrees    QRS Count 18 beats    Q Onset 226 ms    P Onset 161 ms    P Offset 209 ms    T Offset 410 ms    QTC Fredericia 454 ms   Transthoracic Echo (TTE) Complete   Result Value Ref Range    AV pk jonny 1.51 m/s    LVOT diam 1.90 cm    LV Biplane EF 77 %    MV avg E/e' ratio 14.33     MV E/A ratio 0.61     LA vol index A/L 12.9 ml/m2    Tricuspid annular plane systolic excursion 2.1 cm    RV free wall pk S' 8.81 cm/s    LVIDd 3.30 cm    Aortic Valve Area by Continuity of Peak Velocity 2.63 cm2    AV pk grad 9.1 mmHg    LV A4C EF 75.3      Radiographs:  Transthoracic Echo (TTE) Complete   Final Result      Vascular US Carotid Artery Duplex Bilateral   Final Result   Normal flow pattern without evidence of hemodynamically relevant   stenosis. There is bilateral atheromatous plaques in the visualized   portions of the carotid circulation as described above.        The  "velocity criteria are extrapolated from diameter data as defined   by the Society of Radiologists in Ultrasound Consensus Conference   Radiology 2003; 229;340-346.        I personally reviewed the images/study and I agree with the findings   as stated by Resident Chad Ellington. This study was interpreted at   [place], [place].        MACRO:   None        Signed by: Matteo Hoffman 6/19/2024 5:54 AM   Dictation workstation:   [id number]      CT head wo IV contrast   Final Result   No acute intracranial hemorrhage or mass effect.             MACRO:   None        Signed by: Bertrand Haas 6/18/2024 9:54 PM   Dictation workstation:   [id number]      XR chest 1 view   Final Result   1. Slight hyperinflation with some prominent interstitium suggesting   some COPD changes. No evidence of acute intrathoracic abnormality.        I personally reviewed the images/study and I agree with the findings   as stated by Dr. Tan Epperson. This study was interpreted at   [place], [place].        MACRO:   None        Signed by: Meng Grace 6/18/2024 6:52 PM   Dictation workstation:   [id number]            Pertinent Physical Exam At Time of Discharge  Physical Exam     Visit Vitals  BP (!) 183/81   Pulse 94   Temp 36.1 °C (97 °F) (Temporal)   Resp 16   Ht 1.651 m (5' 5\")   Wt 80.7 kg (178 lb)   SpO2 98%   BMI 29.62 kg/m²   BSA 1.92 m²     Physical Exam:  Vital signs reviewed  Appearance: Alert nontoxic-appearing black female in no acute distress.   Skin: Intact, no lesions, rash, petechiae or purpura.   Eyes: PERRLA, EOMs intact,  Conjunctiva clear. No scleral icterus.   ENT: EACs patent. Nares patent bilaterally. MMM.   Neck: Supple. ROM at baseline.   Pulmonary: Clear bilaterally with good chest wall excursion. No rales, rhonchi or wheezing. No accessory muscle use or stridor.  Cardiac: Normal S1, S2 without murmur, rub, gallop or " extrasystole. No JVD, Carotids without bruits.  Abdomen: Soft, non-tender, active bowel sounds.  No palpable organomegaly.  No rebound or guarding.   Musculoskeletal: Moving BUE and BLE without difficulty at baseline. No bilateral lower extremity edema appreciated. No deformities or obvious injuries.  Neurological: Alert and oriented x 3.  Speech clear.  Answers questions appropriate.  No focal neurological deficits.   Psychiatric: Appropriate mood and affect.       Consultants  1) Neurology with recommended to proceed with bedside EEG, if negative safe for discharge from a neurological standpoint with seizure precautions and 1 to 2-month neurology clinic follow-up.  Please refer to consult note for additional documentation.      Impression and Plan    In summary, Sade Sampson has been cared for according to the standard [facility] Clinical Decision Unit observation protocol for Syncope.  Reviewed prior and current encounter documentation including laboratory studies, diagnostic results and interventions via EMR.  Patient underwent a bedside EEG which showed no epileptiform discharges.  Also underwent an echocardiogram with suboptimal images, left ventricular systolic function was hyperdynamic with an EF of 70-75% normal aortic root.  Ultrasound of bilateral carotid arteries shows no significant stenosis, obstruction or aneurysmal changes.  Patient reevaluated, updated results and plan of care.  Patient states she feels well.  Endorses comfortable going home to manage her symptoms.  Patient has not seen a primary care provider since 2016, at that time she was prescribed lisinopril 20 mg daily.  An attempt to control BP we agreed upon starting at a lower dose, patient amenable to starting lisinopril 10 mg daily with a plan to check and log BPs daily to bring it to her follow-up appointment.  Reviewed vitals, remained hypertensive throughout the duration of her ED and CDU course, otherwise  stable and afebrile.  Physical exam as documented.  No focal neurological deficits.  Respiratory cardiac exams unrevealing.  Overall Ms. Sampson is in no apparent distress at this time and has remained stable throughout this CDU admission.  This extended period of observation was specifically required to determine the need for hospitalization. Prior to discharge from observation, the final physical exam is documented above. Based on the patient's condition and test results, the patient will be discharged home with recommendations to follow-up/establish with a PCP, neurology clinic follow-up in 1 to 2 months, check and log BPs daily, take Lisinopril 10 mg daily, strict return precautions were explained.  Patient acknowledged and amenable to plan of care. Dr. Field is the CDU disposition attending.    Date and Time of Disposition.   Discharge: 6/19 at 4:27 PM  Admit: 6/18 at 10:18 PM    Discharge Diagnosis  Syncope    Issues Requiring Follow-Up  None    Discharge Meds     Your medication list        START taking these medications        Instructions Last Dose Given Next Dose Due   lisinopril 10 mg tablet      Take 1 tablet (10 mg) by mouth once daily.                 Where to Get Your Medications        These medications were sent to [facility]  [address], [address], [facility] [place] [facility] [address]      Hours: 8AM to 6PM Mon-Fri, 8AM to 4PM Sat, 9AM to 1PM Sun Phone: [phone]   lisinopril 10 mg tablet         Test Results Pending At Discharge  Pending Labs       No current pending labs.            Outpatient Follow-Up  No future appointments.      Giovanni Cummins III, MSN, CNP  ext [id number]  [facility]  Emergency Medicine/Clinical Decision Unit    Disclaimer: This note was dictated using a speech recognition program.  While an attempt was made at proof reading to minimize errors, minor errors in transcription may be present

## 2024-06-19 NOTE — DISCHARGE INSTRUCTIONS
DISCHARGE HOME PLAN:  --Start Lisinopril 10 mg daily  --Check and log blood pressures daily, bring log with you to follow-up appointment  --Follow-up/establish with a primary care provider, referral placed  --Neurology clinic follow-up in 1-2 months  --Follow seizure precautions as listed below    ------Seizure Precautions---------   - No driving until seizures are under controlled for the period determined by their treating physician.  - Not working with heavy machineries and in close proximity of machines with moving parts.   - Not swimming or bathing unsupervised  (ok to take shower).   - Not working at high places or climbing ladders, avoiding any activity that could put the patient or others at significant risk in the event of a seizure.  - Seizures First Aid and when to call 911 or notify their physician.    RETURN TO THE NEAREST EMERGENCY DEPARTMENT WITH WORSENING SYMPTOMS OR NEW CONCERNS ARISE